# Patient Record
Sex: MALE | Race: WHITE | NOT HISPANIC OR LATINO | URBAN - METROPOLITAN AREA
[De-identification: names, ages, dates, MRNs, and addresses within clinical notes are randomized per-mention and may not be internally consistent; named-entity substitution may affect disease eponyms.]

---

## 2017-05-18 ENCOUNTER — APPOINTMENT (RX ONLY)
Dept: URBAN - METROPOLITAN AREA OTHER 9 | Facility: OTHER | Age: 52
Setting detail: DERMATOLOGY
End: 2017-05-18

## 2017-05-18 DIAGNOSIS — L91.8 OTHER HYPERTROPHIC DISORDERS OF THE SKIN: ICD-10-CM

## 2017-05-18 DIAGNOSIS — L82.0 INFLAMED SEBORRHEIC KERATOSIS: ICD-10-CM

## 2017-05-18 DIAGNOSIS — D22 MELANOCYTIC NEVI: ICD-10-CM

## 2017-05-18 DIAGNOSIS — D18.0 HEMANGIOMA: ICD-10-CM

## 2017-05-18 DIAGNOSIS — L72.8 OTHER FOLLICULAR CYSTS OF THE SKIN AND SUBCUTANEOUS TISSUE: ICD-10-CM

## 2017-05-18 PROBLEM — D22.5 MELANOCYTIC NEVI OF TRUNK: Status: ACTIVE | Noted: 2017-05-18

## 2017-05-18 PROBLEM — D18.01 HEMANGIOMA OF SKIN AND SUBCUTANEOUS TISSUE: Status: ACTIVE | Noted: 2017-05-18

## 2017-05-18 PROCEDURE — 17110 DESTRUCTION B9 LES UP TO 14: CPT

## 2017-05-18 PROCEDURE — ? COUNSELING

## 2017-05-18 PROCEDURE — ? LIQUID NITROGEN

## 2017-05-18 PROCEDURE — 99203 OFFICE O/P NEW LOW 30 MIN: CPT | Mod: 25

## 2017-05-18 ASSESSMENT — LOCATION ZONE DERM
LOCATION ZONE: TOE
LOCATION ZONE: FACE
LOCATION ZONE: TRUNK
LOCATION ZONE: NECK

## 2017-05-18 ASSESSMENT — LOCATION SIMPLE DESCRIPTION DERM
LOCATION SIMPLE: RIGHT 3RD TOE
LOCATION SIMPLE: CHEST
LOCATION SIMPLE: LEFT ANTERIOR NECK
LOCATION SIMPLE: RIGHT TEMPLE
LOCATION SIMPLE: ABDOMEN

## 2017-05-18 ASSESSMENT — LOCATION DETAILED DESCRIPTION DERM
LOCATION DETAILED: LEFT LATERAL SUPERIOR CHEST
LOCATION DETAILED: LEFT SUPERIOR LATERAL NECK
LOCATION DETAILED: RIGHT DORSAL 3RD TOE
LOCATION DETAILED: RIGHT INFERIOR TEMPLE
LOCATION DETAILED: LEFT RIB CAGE

## 2017-05-18 NOTE — PROCEDURE: LIQUID NITROGEN
Consent: Pt advised on the following including but not limited to risks of crusting, scabbing, blistering, scarring, darker or lighter pigmentary change, recurrence, incomplete removal and infection.
Post-Care Instructions: I reviewed with the patient in detail post-care instructions. Patient is to wear sunprotection, and avoid picking at any of the treated lesions. Pt may apply Vaseline to crusted or scabbing areas.
Include Z78.9 (Other Specified Conditions Influencing Health Status) As An Associated Diagnosis?: No
Detail Level: Simple
Medical Necessity Information: It is in your best interest to select a reason for this procedure from the list below. All of these items fulfill various CMS LCD requirements except the new and changing color options.
Medical Necessity Clause: This procedure was medically necessary because the lesions that were treated were:

## 2018-07-25 ENCOUNTER — APPOINTMENT (RX ONLY)
Dept: URBAN - METROPOLITAN AREA OTHER 4 | Facility: OTHER | Age: 53
Setting detail: DERMATOLOGY
End: 2018-07-25

## 2018-07-25 DIAGNOSIS — L72.3 SEBACEOUS CYST: ICD-10-CM

## 2018-07-25 DIAGNOSIS — L81.4 OTHER MELANIN HYPERPIGMENTATION: ICD-10-CM

## 2018-07-25 DIAGNOSIS — D18.0 HEMANGIOMA: ICD-10-CM

## 2018-07-25 DIAGNOSIS — L91.8 OTHER HYPERTROPHIC DISORDERS OF THE SKIN: ICD-10-CM

## 2018-07-25 DIAGNOSIS — D22 MELANOCYTIC NEVI: ICD-10-CM

## 2018-07-25 PROBLEM — D22.5 MELANOCYTIC NEVI OF TRUNK: Status: ACTIVE | Noted: 2018-07-25

## 2018-07-25 PROBLEM — D18.01 HEMANGIOMA OF SKIN AND SUBCUTANEOUS TISSUE: Status: ACTIVE | Noted: 2018-07-25

## 2018-07-25 PROCEDURE — 99214 OFFICE O/P EST MOD 30 MIN: CPT

## 2018-07-25 PROCEDURE — ? COUNSELING

## 2018-07-25 ASSESSMENT — LOCATION DETAILED DESCRIPTION DERM
LOCATION DETAILED: RIGHT AXILLARY VAULT
LOCATION DETAILED: SUPERIOR THORACIC SPINE
LOCATION DETAILED: LEFT AXILLARY VAULT
LOCATION DETAILED: LEFT SUPERIOR LATERAL NECK
LOCATION DETAILED: RIGHT SUPERIOR MEDIAL UPPER BACK
LOCATION DETAILED: LEFT POSTERIOR SHOULDER
LOCATION DETAILED: LEFT MEDIAL INFERIOR CHEST
LOCATION DETAILED: RIGHT POSTERIOR SHOULDER

## 2018-07-25 ASSESSMENT — LOCATION SIMPLE DESCRIPTION DERM
LOCATION SIMPLE: RIGHT SHOULDER
LOCATION SIMPLE: RIGHT AXILLARY VAULT
LOCATION SIMPLE: LEFT SHOULDER
LOCATION SIMPLE: CHEST
LOCATION SIMPLE: RIGHT UPPER BACK
LOCATION SIMPLE: NECK
LOCATION SIMPLE: LEFT AXILLARY VAULT
LOCATION SIMPLE: UPPER BACK

## 2018-07-25 ASSESSMENT — LOCATION ZONE DERM
LOCATION ZONE: NECK
LOCATION ZONE: TRUNK
LOCATION ZONE: ARM
LOCATION ZONE: AXILLAE

## 2019-07-25 ENCOUNTER — APPOINTMENT (RX ONLY)
Dept: URBAN - METROPOLITAN AREA OTHER 4 | Facility: OTHER | Age: 54
Setting detail: DERMATOLOGY
End: 2019-07-25

## 2019-07-25 DIAGNOSIS — D22 MELANOCYTIC NEVI: ICD-10-CM

## 2019-07-25 DIAGNOSIS — L82.1 OTHER SEBORRHEIC KERATOSIS: ICD-10-CM

## 2019-07-25 DIAGNOSIS — D18.0 HEMANGIOMA: ICD-10-CM

## 2019-07-25 DIAGNOSIS — L72.8 OTHER FOLLICULAR CYSTS OF THE SKIN AND SUBCUTANEOUS TISSUE: ICD-10-CM

## 2019-07-25 PROBLEM — D22.5 MELANOCYTIC NEVI OF TRUNK: Status: ACTIVE | Noted: 2019-07-25

## 2019-07-25 PROBLEM — D18.01 HEMANGIOMA OF SKIN AND SUBCUTANEOUS TISSUE: Status: ACTIVE | Noted: 2019-07-25

## 2019-07-25 PROCEDURE — ? COUNSELING

## 2019-07-25 PROCEDURE — 99214 OFFICE O/P EST MOD 30 MIN: CPT

## 2019-07-25 ASSESSMENT — LOCATION SIMPLE DESCRIPTION DERM
LOCATION SIMPLE: CHEST
LOCATION SIMPLE: RIGHT UPPER BACK
LOCATION SIMPLE: UPPER BACK
LOCATION SIMPLE: NECK
LOCATION SIMPLE: RIGHT FOOT

## 2019-07-25 ASSESSMENT — LOCATION DETAILED DESCRIPTION DERM
LOCATION DETAILED: RIGHT DORSAL FOOT
LOCATION DETAILED: RIGHT MID-UPPER BACK
LOCATION DETAILED: LEFT CENTRAL LATERAL NECK
LOCATION DETAILED: SUPERIOR THORACIC SPINE
LOCATION DETAILED: LEFT MEDIAL SUPERIOR CHEST

## 2019-07-25 ASSESSMENT — LOCATION ZONE DERM
LOCATION ZONE: TRUNK
LOCATION ZONE: FEET
LOCATION ZONE: NECK

## 2019-07-25 ASSESSMENT — PAIN INTENSITY VAS: HOW INTENSE IS YOUR PAIN 0 BEING NO PAIN, 10 BEING THE MOST SEVERE PAIN POSSIBLE?: NO PAIN

## 2020-07-29 ENCOUNTER — APPOINTMENT (RX ONLY)
Dept: URBAN - METROPOLITAN AREA OTHER 4 | Facility: OTHER | Age: 55
Setting detail: DERMATOLOGY
End: 2020-07-29

## 2020-07-29 DIAGNOSIS — D22 MELANOCYTIC NEVI: ICD-10-CM

## 2020-07-29 DIAGNOSIS — L81.4 OTHER MELANIN HYPERPIGMENTATION: ICD-10-CM

## 2020-07-29 DIAGNOSIS — L82.1 OTHER SEBORRHEIC KERATOSIS: ICD-10-CM

## 2020-07-29 DIAGNOSIS — L82.0 INFLAMED SEBORRHEIC KERATOSIS: ICD-10-CM

## 2020-07-29 DIAGNOSIS — D18.0 HEMANGIOMA: ICD-10-CM

## 2020-07-29 PROBLEM — D22.5 MELANOCYTIC NEVI OF TRUNK: Status: ACTIVE | Noted: 2020-07-29

## 2020-07-29 PROBLEM — D18.01 HEMANGIOMA OF SKIN AND SUBCUTANEOUS TISSUE: Status: ACTIVE | Noted: 2020-07-29

## 2020-07-29 PROCEDURE — 99214 OFFICE O/P EST MOD 30 MIN: CPT | Mod: 25

## 2020-07-29 PROCEDURE — ? LIQUID NITROGEN

## 2020-07-29 PROCEDURE — 17110 DESTRUCTION B9 LES UP TO 14: CPT

## 2020-07-29 PROCEDURE — ? COUNSELING

## 2020-07-29 ASSESSMENT — LOCATION ZONE DERM
LOCATION ZONE: ARM
LOCATION ZONE: TRUNK

## 2020-07-29 ASSESSMENT — LOCATION SIMPLE DESCRIPTION DERM
LOCATION SIMPLE: UPPER BACK
LOCATION SIMPLE: ABDOMEN
LOCATION SIMPLE: CHEST
LOCATION SIMPLE: LEFT SHOULDER
LOCATION SIMPLE: RIGHT UPPER BACK

## 2020-07-29 ASSESSMENT — PAIN INTENSITY VAS: HOW INTENSE IS YOUR PAIN 0 BEING NO PAIN, 10 BEING THE MOST SEVERE PAIN POSSIBLE?: NO PAIN

## 2020-07-29 ASSESSMENT — LOCATION DETAILED DESCRIPTION DERM
LOCATION DETAILED: LEFT POSTERIOR SHOULDER
LOCATION DETAILED: SUPERIOR THORACIC SPINE
LOCATION DETAILED: RIGHT MID-UPPER BACK
LOCATION DETAILED: LEFT RIB CAGE
LOCATION DETAILED: LEFT MEDIAL SUPERIOR CHEST

## 2020-07-29 NOTE — PROCEDURE: LIQUID NITROGEN
Consent: Pt advised on the following including but not limited to risks of crusting, scabbing, blistering, scarring, darker or lighter pigmentary change, recurrence, incomplete removal and infection.
Include Z78.9 (Other Specified Conditions Influencing Health Status) As An Associated Diagnosis?: No
Detail Level: Simple
Medical Necessity Information: It is in your best interest to select a reason for this procedure from the list below. All of these items fulfill various CMS LCD requirements except the new and changing color options.
Pared With?: 15 blade
Post-Care Instructions: I reviewed with the patient in detail post-care instructions. Patient is to wear sunprotection, and avoid picking at any of the treated lesions. Pt may apply Vaseline to crusted or scabbing areas.
Medical Necessity Clause: This procedure was medically necessary because the lesions that were treated were:
Duration Of Freeze Thaw-Cycle (Seconds): 5-10
Render Post-Care Instructions In Note?: yes

## 2020-11-02 ENCOUNTER — LAB OUTSIDE AN ENCOUNTER (OUTPATIENT)
Dept: URBAN - METROPOLITAN AREA TELEHEALTH 2 | Facility: TELEHEALTH | Age: 55
End: 2020-11-02

## 2020-11-02 ENCOUNTER — TELEPHONE ENCOUNTER (OUTPATIENT)
Dept: URBAN - METROPOLITAN AREA CLINIC 92 | Facility: CLINIC | Age: 55
End: 2020-11-02

## 2020-11-02 ENCOUNTER — OFFICE VISIT (OUTPATIENT)
Dept: URBAN - METROPOLITAN AREA TELEHEALTH 2 | Facility: TELEHEALTH | Age: 55
End: 2020-11-02
Payer: COMMERCIAL

## 2020-11-02 DIAGNOSIS — K63.5 COLON POLYP: ICD-10-CM

## 2020-11-02 DIAGNOSIS — Z09 FOLLOW UP: ICD-10-CM

## 2020-11-02 DIAGNOSIS — Z91.89 COLON CANCER HIGH RISK: ICD-10-CM

## 2020-11-02 DIAGNOSIS — K22.70 BARRETT ESOPHAGUS: ICD-10-CM

## 2020-11-02 PROCEDURE — G8427 DOCREV CUR MEDS BY ELIG CLIN: HCPCS | Performed by: INTERNAL MEDICINE

## 2020-11-02 PROCEDURE — G9622 NO UNHEAL ETOH USER: HCPCS | Performed by: INTERNAL MEDICINE

## 2020-11-02 PROCEDURE — 3017F COLORECTAL CA SCREEN DOC REV: CPT | Performed by: INTERNAL MEDICINE

## 2020-11-02 PROCEDURE — G9903 PT SCRN TBCO ID AS NON USER: HCPCS | Performed by: INTERNAL MEDICINE

## 2020-11-02 PROCEDURE — G8482 FLU IMMUNIZE ORDER/ADMIN: HCPCS | Performed by: INTERNAL MEDICINE

## 2020-11-02 PROCEDURE — 1036F TOBACCO NON-USER: CPT | Performed by: INTERNAL MEDICINE

## 2020-11-02 PROCEDURE — G8420 CALC BMI NORM PARAMETERS: HCPCS | Performed by: INTERNAL MEDICINE

## 2020-11-02 PROCEDURE — 99214 OFFICE O/P EST MOD 30 MIN: CPT | Performed by: INTERNAL MEDICINE

## 2020-11-02 RX ORDER — SODIUM, POTASSIUM,MAG SULFATES 17.5-3.13G
354ML SOLUTION, RECONSTITUTED, ORAL ORAL
Qty: 354 MILLILITER | Refills: 0 | OUTPATIENT
Start: 2020-11-02 | End: 2020-11-03

## 2020-11-02 RX ORDER — OMEPRAZOLE 40 MG/1
1 CAPSULE CAPSULE, DELAYED RELEASE ORAL ONCE A DAY
Qty: 90 CAPSULE | Refills: 4 | OUTPATIENT

## 2020-11-02 RX ORDER — TERAZOSIN 2 MG/1
TAKE 1 CAPSULE (2 MG) BY ORAL ROUTE ONCE DAILY AT BEDTIME CAPSULE ORAL 1
Qty: 0 | Refills: 0 | Status: ACTIVE | COMMUNITY
Start: 1900-01-01 | End: 1900-01-01

## 2020-12-31 ENCOUNTER — OFFICE VISIT (OUTPATIENT)
Dept: URBAN - METROPOLITAN AREA SURGERY CENTER 18 | Facility: SURGERY CENTER | Age: 55
End: 2020-12-31
Payer: COMMERCIAL

## 2020-12-31 ENCOUNTER — TELEPHONE ENCOUNTER (OUTPATIENT)
Dept: URBAN - METROPOLITAN AREA CLINIC 92 | Facility: CLINIC | Age: 55
End: 2020-12-31

## 2020-12-31 DIAGNOSIS — K29.60 ADENOPAPILLOMATOSIS GASTRICA: ICD-10-CM

## 2020-12-31 DIAGNOSIS — K63.5 BENIGN COLON POLYP: ICD-10-CM

## 2020-12-31 DIAGNOSIS — Z86.010 H/O ADENOMATOUS POLYP OF COLON: ICD-10-CM

## 2020-12-31 DIAGNOSIS — K22.8 COLUMNAR-LINED ESOPHAGUS: ICD-10-CM

## 2020-12-31 PROCEDURE — 43239 EGD BIOPSY SINGLE/MULTIPLE: CPT | Performed by: INTERNAL MEDICINE

## 2020-12-31 PROCEDURE — G8907 PT DOC NO EVENTS ON DISCHARG: HCPCS | Performed by: INTERNAL MEDICINE

## 2020-12-31 PROCEDURE — 45380 COLONOSCOPY AND BIOPSY: CPT | Performed by: INTERNAL MEDICINE

## 2020-12-31 PROCEDURE — G9936 PMH PLYP/NEO CO/RECT/JUN/ANS: HCPCS | Performed by: INTERNAL MEDICINE

## 2020-12-31 RX ORDER — TERAZOSIN 2 MG/1
TAKE 1 CAPSULE (2 MG) BY ORAL ROUTE ONCE DAILY AT BEDTIME CAPSULE ORAL 1
Qty: 0 | Refills: 0 | Status: ACTIVE | COMMUNITY
Start: 1900-01-01 | End: 1900-01-01

## 2020-12-31 RX ORDER — OMEPRAZOLE 40 MG/1
1 CAPSULE CAPSULE, DELAYED RELEASE ORAL ONCE A DAY
Qty: 90 CAPSULE | Refills: 4 | Status: ACTIVE | COMMUNITY

## 2021-01-01 ENCOUNTER — TELEPHONE ENCOUNTER (OUTPATIENT)
Dept: URBAN - METROPOLITAN AREA CLINIC 92 | Facility: CLINIC | Age: 56
End: 2021-01-01

## 2021-02-16 ENCOUNTER — OFFICE VISIT (OUTPATIENT)
Dept: URBAN - METROPOLITAN AREA CLINIC 96 | Facility: CLINIC | Age: 56
End: 2021-02-16
Payer: COMMERCIAL

## 2021-02-16 ENCOUNTER — WEB ENCOUNTER (OUTPATIENT)
Dept: URBAN - METROPOLITAN AREA CLINIC 96 | Facility: CLINIC | Age: 56
End: 2021-02-16

## 2021-02-16 ENCOUNTER — LAB OUTSIDE AN ENCOUNTER (OUTPATIENT)
Dept: URBAN - METROPOLITAN AREA CLINIC 96 | Facility: CLINIC | Age: 56
End: 2021-02-16

## 2021-02-16 DIAGNOSIS — K63.5 COLON POLYP: ICD-10-CM

## 2021-02-16 DIAGNOSIS — Z09 FOLLOW UP: ICD-10-CM

## 2021-02-16 DIAGNOSIS — K59.01 CONSTIPATION: ICD-10-CM

## 2021-02-16 DIAGNOSIS — K21.9 GERD (GASTROESOPHAGEAL REFLUX DISEASE): ICD-10-CM

## 2021-02-16 DIAGNOSIS — Z71.89 VACCINE COUNSELING: ICD-10-CM

## 2021-02-16 DIAGNOSIS — K22.70 BARRETT ESOPHAGUS: ICD-10-CM

## 2021-02-16 PROCEDURE — 1036F TOBACCO NON-USER: CPT | Performed by: INTERNAL MEDICINE

## 2021-02-16 PROCEDURE — 99214 OFFICE O/P EST MOD 30 MIN: CPT | Performed by: INTERNAL MEDICINE

## 2021-02-16 PROCEDURE — 3017F COLORECTAL CA SCREEN DOC REV: CPT | Performed by: INTERNAL MEDICINE

## 2021-02-16 PROCEDURE — G9622 NO UNHEAL ETOH USER: HCPCS | Performed by: INTERNAL MEDICINE

## 2021-02-16 PROCEDURE — G8420 CALC BMI NORM PARAMETERS: HCPCS | Performed by: INTERNAL MEDICINE

## 2021-02-16 PROCEDURE — G8482 FLU IMMUNIZE ORDER/ADMIN: HCPCS | Performed by: INTERNAL MEDICINE

## 2021-02-16 PROCEDURE — G8427 DOCREV CUR MEDS BY ELIG CLIN: HCPCS | Performed by: INTERNAL MEDICINE

## 2021-02-16 PROCEDURE — G9903 PT SCRN TBCO ID AS NON USER: HCPCS | Performed by: INTERNAL MEDICINE

## 2021-02-16 RX ORDER — OMEPRAZOLE 40 MG/1
1 CAPSULE CAPSULE, DELAYED RELEASE ORAL ONCE A DAY
Qty: 90 CAPSULE | Refills: 4 | Status: ACTIVE | COMMUNITY

## 2021-02-16 RX ORDER — PANTOPRAZOLE SODIUM 40 MG/1
1 TABLET TABLET, DELAYED RELEASE ORAL ONCE A DAY
Qty: 90 | Refills: 4 | OUTPATIENT
Start: 2021-02-16

## 2021-02-16 RX ORDER — TERAZOSIN 2 MG/1
TAKE 1 CAPSULE (2 MG) BY ORAL ROUTE ONCE DAILY AT BEDTIME CAPSULE ORAL 1
Qty: 0 | Refills: 0 | Status: ACTIVE | COMMUNITY
Start: 1900-01-01

## 2021-02-16 NOTE — HPI-TODAY'S VISIT:
Tiago Maher is a 56 y/o individual with h/o Barrera's esophagus who is here for follow-up of his condition. . He has had chronic reflux disease.  His disease is under control on omeprazole 40mg daily.  If he misses a dose, he has recurrence of sxs within 1-2 days.  No N/V/abdominal pain, no dysphagia.   Normal BM, no blood in the stool.  . Previously on 11/2/2020, pt reports that overall he is doing well.  His reflux is doing well overall.  Is taking his H2 blocker, nearly on a daily basis.  . Today on 2/16/21, pt reports that he has no sxs of GERD while taking omeprazole.  He had EGD which revealed active inflammation, but no Barretts or dysplasia/metaplasia.  He will occasionally skip a dose of PPI, but flares. .  SH:  . PMH: GERD and Barrera's h/o.  BPH FH: No GI malignancy . 12/2020: A Gastric, biopsy Chronic gastritis. No Helicobacter pylori microorganisms are identified with a special stain. B Esophagogastric junction, biopsy Gastric mucosa with chronic inflammation. No intestinal metaplasia identified. An alcian blue/PAS stain is negative for both fungal elements and intestinal metaplasia. C Transverse colon, polypectomy Hyperplastic polyp. .  Colonoscopy with h/o polyps (2007 and 2010), no polyp in 2015, due in 2020 . 6/2018 EGD: There were esophageal mucosal changes secondary to established short-segment Barrera's disease present in the lower third of the esophagus.  Mucosa was biopsied with a cold forceps for histology in 4 quadrants in the lower third of the esophagus. . FINAL MICROSCOPIC DIAGNOSIS:  A. DISTAL ESOPHAGUS, BIOPSY: -Squamous mucosa with reflux associated changes. -Gastric cardia type mucosa with chronic inflammation and foveolar hyperplasia. -Alcian blue/PAS stain is negative for intestinal metaplasia. No fungus. -There is no evidence of dysplasia or malignancy. . 12/2016: chronic esophagitis, no dysplasia .

## 2021-08-04 ENCOUNTER — APPOINTMENT (RX ONLY)
Dept: URBAN - METROPOLITAN AREA OTHER 4 | Facility: OTHER | Age: 56
Setting detail: DERMATOLOGY
End: 2021-08-04

## 2021-08-04 DIAGNOSIS — L82.1 OTHER SEBORRHEIC KERATOSIS: ICD-10-CM

## 2021-08-04 DIAGNOSIS — L57.0 ACTINIC KERATOSIS: ICD-10-CM

## 2021-08-04 DIAGNOSIS — B35.1 TINEA UNGUIUM: ICD-10-CM

## 2021-08-04 DIAGNOSIS — D22 MELANOCYTIC NEVI: ICD-10-CM

## 2021-08-04 DIAGNOSIS — L57.8 OTHER SKIN CHANGES DUE TO CHRONIC EXPOSURE TO NONIONIZING RADIATION: ICD-10-CM

## 2021-08-04 PROBLEM — D22.5 MELANOCYTIC NEVI OF TRUNK: Status: ACTIVE | Noted: 2021-08-04

## 2021-08-04 PROCEDURE — ? LIQUID NITROGEN

## 2021-08-04 PROCEDURE — 99213 OFFICE O/P EST LOW 20 MIN: CPT | Mod: 25

## 2021-08-04 PROCEDURE — 17000 DESTRUCT PREMALG LESION: CPT

## 2021-08-04 PROCEDURE — ? COUNSELING

## 2021-08-04 ASSESSMENT — LOCATION SIMPLE DESCRIPTION DERM
LOCATION SIMPLE: LEFT UPPER BACK
LOCATION SIMPLE: RIGHT FOREHEAD
LOCATION SIMPLE: LEFT ANTERIOR NECK
LOCATION SIMPLE: RIGHT GREAT TOE
LOCATION SIMPLE: LEFT GREAT TOE

## 2021-08-04 ASSESSMENT — LOCATION DETAILED DESCRIPTION DERM
LOCATION DETAILED: LEFT MID-UPPER BACK
LOCATION DETAILED: LEFT MEDIAL UPPER BACK
LOCATION DETAILED: RIGHT INFERIOR LATERAL FOREHEAD
LOCATION DETAILED: RIGHT GREAT TOENAIL
LOCATION DETAILED: LEFT CLAVICULAR NECK
LOCATION DETAILED: LEFT DISTAL PLANTAR GREAT TOE

## 2021-08-04 ASSESSMENT — LOCATION ZONE DERM
LOCATION ZONE: TOENAIL
LOCATION ZONE: TRUNK
LOCATION ZONE: NECK
LOCATION ZONE: FACE
LOCATION ZONE: TOE

## 2021-08-04 NOTE — PROCEDURE: LIQUID NITROGEN
Render Post-Care Instructions In Note?: no
Detail Level: Simple
Consent: The patient's consent was obtained including but not limited to risks of crusting, scabbing, blistering, scarring, darker or lighter pigmentary change, recurrence, incomplete removal and infection.
Duration Of Freeze Thaw-Cycle (Seconds): 5
Post-Care Instructions: I reviewed with the patient in detail post-care instructions. Patient is to wear sunprotection, and avoid picking at any of the treated lesions. Pt may apply Vaseline to crusted or scabbing areas.
Number Of Freeze-Thaw Cycles: 1 freeze-thaw cycle
Show Applicator Variable?: Yes

## 2022-02-01 ENCOUNTER — OFFICE VISIT (OUTPATIENT)
Dept: URBAN - METROPOLITAN AREA CLINIC 96 | Facility: CLINIC | Age: 57
End: 2022-02-01

## 2022-02-01 RX ORDER — TERAZOSIN 2 MG/1
TAKE 1 CAPSULE (2 MG) BY ORAL ROUTE ONCE DAILY AT BEDTIME CAPSULE ORAL 1
Qty: 0 | Refills: 0 | COMMUNITY
Start: 1900-01-01

## 2022-02-01 RX ORDER — PANTOPRAZOLE SODIUM 40 MG/1
1 TABLET TABLET, DELAYED RELEASE ORAL ONCE A DAY
Qty: 90 | Refills: 4 | COMMUNITY
Start: 2021-02-16

## 2022-02-01 RX ORDER — OMEPRAZOLE 40 MG/1
1 CAPSULE CAPSULE, DELAYED RELEASE ORAL ONCE A DAY
Qty: 90 CAPSULE | Refills: 4 | COMMUNITY

## 2022-02-22 ENCOUNTER — OFFICE VISIT (OUTPATIENT)
Dept: URBAN - METROPOLITAN AREA CLINIC 96 | Facility: CLINIC | Age: 57
End: 2022-02-22
Payer: COMMERCIAL

## 2022-02-22 ENCOUNTER — LAB OUTSIDE AN ENCOUNTER (OUTPATIENT)
Dept: URBAN - METROPOLITAN AREA CLINIC 96 | Facility: CLINIC | Age: 57
End: 2022-02-22

## 2022-02-22 DIAGNOSIS — K22.70 BARRETT ESOPHAGUS: ICD-10-CM

## 2022-02-22 DIAGNOSIS — K63.5 COLON POLYP: ICD-10-CM

## 2022-02-22 DIAGNOSIS — K21.9 GERD (GASTROESOPHAGEAL REFLUX DISEASE): ICD-10-CM

## 2022-02-22 DIAGNOSIS — K59.01 CONSTIPATION: ICD-10-CM

## 2022-02-22 DIAGNOSIS — Z09 FOLLOW UP: ICD-10-CM

## 2022-02-22 PROCEDURE — 99213 OFFICE O/P EST LOW 20 MIN: CPT | Performed by: INTERNAL MEDICINE

## 2022-02-22 RX ORDER — PANTOPRAZOLE SODIUM 40 MG/1
1 TABLET TABLET, DELAYED RELEASE ORAL ONCE A DAY
Qty: 90 | Refills: 4 | OUTPATIENT

## 2022-02-22 RX ORDER — PANTOPRAZOLE SODIUM 40 MG/1
1 TABLET TABLET, DELAYED RELEASE ORAL ONCE A DAY
Qty: 90 | Refills: 4 | COMMUNITY
Start: 2021-02-16

## 2022-02-22 RX ORDER — OMEPRAZOLE 40 MG/1
1 CAPSULE CAPSULE, DELAYED RELEASE ORAL ONCE A DAY
Qty: 90 CAPSULE | Refills: 4 | COMMUNITY

## 2022-02-22 RX ORDER — TERAZOSIN 2 MG/1
TAKE 1 CAPSULE (2 MG) BY ORAL ROUTE ONCE DAILY AT BEDTIME CAPSULE ORAL 1
Qty: 0 | Refills: 0 | COMMUNITY
Start: 1900-01-01

## 2022-02-22 NOTE — HPI-TODAY'S VISIT:
Pt Gunnar is a 57 y/o individual with h/o Barrera's esophagus who is here for follow-up of his condition. . He has had chronic reflux disease.  His disease is under control on omeprazole 40mg daily.  If he misses a dose, he has recurrence of sxs within 1-2 days.  No N/V/abdominal pain, no dysphagia.   Normal BM, no blood in the stool.  . Previously on 11/2/2020, pt reports that overall he is doing well.  His reflux is doing well overall.  Is taking his H2 blocker, nearly on a daily basis. . Previously on 2/16/21, pt reports that he has no sxs of GERD while taking omeprazole.  He had EGD which revealed active inflammation, but no Barretts or dysplasia/metaplasia.  He will occasionally skip a dose of PPI, but flares. . Today on 2/22/2022, pt reports that he is doing on once daily protonix  Does not take Pepcid.   . SH:  . PMH: GERD and Barrera's h/o.  BPH FH: No GI malignancy . 12/2020: A Gastric, biopsy Chronic gastritis. No Helicobacter pylori microorganisms are identified with a special stain. B Esophagogastric junction, biopsy Gastric mucosa with chronic inflammation. No intestinal metaplasia identified. An alcian blue/PAS stain is negative for both fungal elements and intestinal metaplasia. C Transverse colon, polypectomy Hyperplastic polyp. . Colonoscopy with h/o polyps (2007 and 2010), no polyp in 2015, due in 2020 . 6/2018 EGD: There were esophageal mucosal changes secondary to established short-segment Barrera's disease present in the lower third of the esophagus.  Mucosa was biopsied with a cold forceps for histology in 4 quadrants in the lower third of the esophagus. . FINAL MICROSCOPIC DIAGNOSIS:  A. DISTAL ESOPHAGUS, BIOPSY: -Squamous mucosa with reflux associated changes. -Gastric cardia type mucosa with chronic inflammation and foveolar hyperplasia. -Alcian blue/PAS stain is negative for intestinal metaplasia. No fungus. -There is no evidence of dysplasia or malignancy. . 12/2016: chronic esophagitis, no dysplasia

## 2022-04-14 ENCOUNTER — OFFICE VISIT (OUTPATIENT)
Dept: URBAN - METROPOLITAN AREA SURGERY CENTER 18 | Facility: SURGERY CENTER | Age: 57
End: 2022-04-14
Payer: COMMERCIAL

## 2022-04-14 DIAGNOSIS — K22.89 OTHER SPECIFIED DISEASE OF ESOPHAGUS: ICD-10-CM

## 2022-04-14 DIAGNOSIS — K31.89 GASTRIC CYST: ICD-10-CM

## 2022-04-14 PROCEDURE — G8907 PT DOC NO EVENTS ON DISCHARG: HCPCS | Performed by: INTERNAL MEDICINE

## 2022-04-14 PROCEDURE — 43239 EGD BIOPSY SINGLE/MULTIPLE: CPT | Performed by: INTERNAL MEDICINE

## 2022-04-14 RX ORDER — OMEPRAZOLE 40 MG/1
1 CAPSULE CAPSULE, DELAYED RELEASE ORAL ONCE A DAY
Qty: 90 CAPSULE | Refills: 4 | COMMUNITY

## 2022-04-14 RX ORDER — TERAZOSIN 2 MG/1
TAKE 1 CAPSULE (2 MG) BY ORAL ROUTE ONCE DAILY AT BEDTIME CAPSULE ORAL 1
Qty: 0 | Refills: 0 | COMMUNITY
Start: 1900-01-01

## 2022-04-14 RX ORDER — PANTOPRAZOLE SODIUM 40 MG/1
1 TABLET TABLET, DELAYED RELEASE ORAL ONCE A DAY
Qty: 90 | Refills: 4 | Status: ACTIVE | COMMUNITY

## 2022-07-20 ENCOUNTER — OFFICE VISIT (OUTPATIENT)
Dept: URBAN - METROPOLITAN AREA CLINIC 96 | Facility: CLINIC | Age: 57
End: 2022-07-20
Payer: COMMERCIAL

## 2022-07-20 VITALS — WEIGHT: 188 LBS | HEIGHT: 72 IN | BODY MASS INDEX: 25.47 KG/M2

## 2022-07-20 DIAGNOSIS — K59.01 CONSTIPATION: ICD-10-CM

## 2022-07-20 DIAGNOSIS — K21.9 GERD (GASTROESOPHAGEAL REFLUX DISEASE): ICD-10-CM

## 2022-07-20 DIAGNOSIS — Z09 FOLLOW UP: ICD-10-CM

## 2022-07-20 DIAGNOSIS — K63.5 COLON POLYP: ICD-10-CM

## 2022-07-20 DIAGNOSIS — K20.0 EOSINOPHILIC ESOPHAGITIS: ICD-10-CM

## 2022-07-20 DIAGNOSIS — K22.70 BARRETT ESOPHAGUS: ICD-10-CM

## 2022-07-20 PROBLEM — 235599003: Status: ACTIVE | Noted: 2022-07-20

## 2022-07-20 PROBLEM — 235595009 GASTROESOPHAGEAL REFLUX DISEASE: Status: ACTIVE | Noted: 2021-02-16

## 2022-07-20 PROBLEM — 14760008 CONSTIPATION: Status: ACTIVE | Noted: 2021-02-16

## 2022-07-20 PROCEDURE — 99214 OFFICE O/P EST MOD 30 MIN: CPT | Performed by: INTERNAL MEDICINE

## 2022-07-20 RX ORDER — PANTOPRAZOLE SODIUM 40 MG/1
1 TABLET TABLET, DELAYED RELEASE ORAL ONCE A DAY
Qty: 90 | Refills: 4 | Status: ACTIVE | COMMUNITY

## 2022-07-20 RX ORDER — TERAZOSIN 2 MG/1
TAKE 1 CAPSULE (2 MG) BY ORAL ROUTE ONCE DAILY AT BEDTIME CAPSULE ORAL 1
Qty: 0 | Refills: 0 | COMMUNITY
Start: 1900-01-01

## 2022-07-20 RX ORDER — PANTOPRAZOLE SODIUM 40 MG/1
1 TABLET TABLET, DELAYED RELEASE ORAL ONCE A DAY
Qty: 90 | Refills: 4 | OUTPATIENT

## 2022-07-20 RX ORDER — PANTOPRAZOLE SODIUM 20 MG/1
1 TABLET TABLET, DELAYED RELEASE ORAL ONCE A DAY
Qty: 30 TABLET | Refills: 11 | OUTPATIENT
Start: 2022-07-20

## 2022-07-20 RX ORDER — OMEPRAZOLE 40 MG/1
1 CAPSULE CAPSULE, DELAYED RELEASE ORAL ONCE A DAY
Qty: 90 CAPSULE | Refills: 4 | COMMUNITY

## 2022-07-20 NOTE — HPI-TODAY'S VISIT:
Pt Gunnar is a 58 y/o individual with h/o Barrera's esophagus who is here for follow-up of his condition. . He has had chronic reflux disease.  His disease is under control on omeprazole 40mg daily.  If he misses a dose, he has recurrence of sxs within 1-2 days.  No N/V/abdominal pain, no dysphagia.   Normal BM, no blood in the stool.  . Previously on 11/2/2020, pt reports that overall he is doing well.  His reflux is doing well overall.  Is taking his H2 blocker, nearly on a daily basis. . Previously on 2/16/21, pt reports that he has no sxs of GERD while taking omeprazole.  He had EGD which revealed active inflammation, but no Barretts or dysplasia/metaplasia.  He will occasionally skip a dose of PPI, but flares. . Previously on 2/22/2022, pt reports that he is doing on once daily protonix  Does not take Pepcid.   . Today on 7/20/2022, pt reports that he has no dysphagia/n/v/gerd.  If he misses a day, has breakthrough. . SH:  . PMH: GERD and Barrera's h/o.  BPH FH: No GI malignancy . 4/2022: The examined duodenum was normal Patchy mildly erythematous mucosa without bleeding was found in the entire examined stomach. Biopsies were taken with a cold forceps for histology. The pathology specimen was placed into Bottle Number 1 The Z-line was irregular and was found 35 cm from the incisors. Biopsies were taken with a cold forceps for histology. The pathology specimen was placed into Bottle Number 2. A small hiatal hernia was present The upper third of the esophagus and middle third of the esophagus were normal. Overall, it appears to be in good control. . A Gastric, biopsy Gastric mucosa with slight, nonspecific chronic inflammation. No Helicobacter pylori microorganisms identified with a special stain. B Esophagogastric junction, biopsy Increased intraepithelial eosinophils (maximum count of 22 in a single microscopic high power field). AN ALCIAN BLUE/PAS STAIN IS NEGATIVE FOR BOTH INTESTINAL METAPLASIA AND FUNGAL ELEMENTS. See comment. COMMENT: No single absolute eosinophilic count is fully diagnostic of eosinophilic esophagitis. In the proper clinical setting (see below), these findings raise the possibility of a diagnosis of "eosinophilic esophagitis". Eosinophilic esophagitis is defined as a clinicopathologic entity, combining clinical data on: (1) relevant symptoms (e.g. food impaction and dysphagia in adults); (2) >/= 15 eosinophils per high power field in esophageal biopsies; and (3) exclusion of other causes of eosinophilia, especially GERD. Other causes of eosinophilia include the following: GERD, eosinophilic gastroenteritis, hypereosinophilic syndrome, parasitic infection, fungal infection, drug-induced injury, inflammatory bowel disease, various vasculitides, collagen vascular disease, and recurrent vomiting. Correlation with clinical and endoscopic findings is necessary . 12/2020: A Gastric, biopsy Chronic gastritis. No Helicobacter pylori microorganisms are identified with a special stain. B Esophagogastric junction, biopsy Gastric mucosa with chronic inflammation. No intestinal metaplasia identified. An alcian blue/PAS stain is negative for both fungal elements and intestinal metaplasia. C Transverse colon, polypectomy Hyperplastic polyp. . Colonoscopy with h/o polyps (2007 and 2010), no polyp in 2015, due in 2020 . 6/2018 EGD: There were esophageal mucosal changes secondary to established short-segment Barrera's disease present in the lower third of the esophagus.  Mucosa was biopsied with a cold forceps for histology in 4 quadrants in the lower third of the esophagus. . FINAL MICROSCOPIC DIAGNOSIS:  A. DISTAL ESOPHAGUS, BIOPSY: -Squamous mucosa with reflux associated changes. -Gastric cardia type mucosa with chronic inflammation and foveolar hyperplasia. -Alcian blue/PAS stain is negative for intestinal metaplasia. No fungus. -There is no evidence of dysplasia or malignancy. . 12/2016: chronic esophagitis, no dysplasia .

## 2022-08-08 ENCOUNTER — APPOINTMENT (RX ONLY)
Dept: URBAN - METROPOLITAN AREA OTHER 4 | Facility: OTHER | Age: 57
Setting detail: DERMATOLOGY
End: 2022-08-08

## 2022-08-08 DIAGNOSIS — L72.8 OTHER FOLLICULAR CYSTS OF THE SKIN AND SUBCUTANEOUS TISSUE: ICD-10-CM

## 2022-08-08 DIAGNOSIS — D22 MELANOCYTIC NEVI: ICD-10-CM

## 2022-08-08 DIAGNOSIS — L57.8 OTHER SKIN CHANGES DUE TO CHRONIC EXPOSURE TO NONIONIZING RADIATION: ICD-10-CM

## 2022-08-08 DIAGNOSIS — L82.1 OTHER SEBORRHEIC KERATOSIS: ICD-10-CM

## 2022-08-08 DIAGNOSIS — D18.0 HEMANGIOMA: ICD-10-CM

## 2022-08-08 PROBLEM — D22.5 MELANOCYTIC NEVI OF TRUNK: Status: ACTIVE | Noted: 2022-08-08

## 2022-08-08 PROBLEM — D18.01 HEMANGIOMA OF SKIN AND SUBCUTANEOUS TISSUE: Status: ACTIVE | Noted: 2022-08-08

## 2022-08-08 PROCEDURE — ? COUNSELING

## 2022-08-08 PROCEDURE — 99213 OFFICE O/P EST LOW 20 MIN: CPT

## 2022-08-08 ASSESSMENT — LOCATION DETAILED DESCRIPTION DERM
LOCATION DETAILED: LEFT SUPERIOR LATERAL NECK
LOCATION DETAILED: LEFT INFERIOR UPPER BACK
LOCATION DETAILED: LEFT MID-UPPER BACK
LOCATION DETAILED: LEFT CENTRAL MALAR CHEEK
LOCATION DETAILED: LEFT INFERIOR LATERAL MIDBACK

## 2022-08-08 ASSESSMENT — LOCATION ZONE DERM
LOCATION ZONE: TRUNK
LOCATION ZONE: FACE
LOCATION ZONE: NECK

## 2022-08-08 ASSESSMENT — LOCATION SIMPLE DESCRIPTION DERM
LOCATION SIMPLE: LEFT CHEEK
LOCATION SIMPLE: LEFT LOWER BACK
LOCATION SIMPLE: NECK
LOCATION SIMPLE: LEFT UPPER BACK

## 2023-07-19 ENCOUNTER — WEB ENCOUNTER (OUTPATIENT)
Age: 58
End: 2023-07-19

## 2023-07-19 ENCOUNTER — TELEPHONE ENCOUNTER (OUTPATIENT)
Dept: URBAN - METROPOLITAN AREA CLINIC 96 | Facility: CLINIC | Age: 58
End: 2023-07-19

## 2023-07-19 RX ORDER — PANTOPRAZOLE SODIUM 40 MG/1
1 TABLET TABLET, DELAYED RELEASE ORAL ONCE A DAY
Qty: 30 | Refills: 0

## 2023-07-19 RX ORDER — PANTOPRAZOLE SODIUM 40 MG/1
1 TABLET TABLET, DELAYED RELEASE ORAL ONCE A DAY
Qty: 30 | Refills: 3

## 2023-08-09 ENCOUNTER — APPOINTMENT (RX ONLY)
Dept: URBAN - METROPOLITAN AREA OTHER 5 | Facility: OTHER | Age: 58
Setting detail: DERMATOLOGY
End: 2023-08-09

## 2023-08-09 DIAGNOSIS — L82.1 OTHER SEBORRHEIC KERATOSIS: ICD-10-CM

## 2023-08-09 DIAGNOSIS — D18.0 HEMANGIOMA: ICD-10-CM

## 2023-08-09 DIAGNOSIS — L72.0 EPIDERMAL CYST: ICD-10-CM

## 2023-08-09 DIAGNOSIS — L57.8 OTHER SKIN CHANGES DUE TO CHRONIC EXPOSURE TO NONIONIZING RADIATION: ICD-10-CM

## 2023-08-09 DIAGNOSIS — D22 MELANOCYTIC NEVI: ICD-10-CM

## 2023-08-09 PROBLEM — D18.01 HEMANGIOMA OF SKIN AND SUBCUTANEOUS TISSUE: Status: ACTIVE | Noted: 2023-08-09

## 2023-08-09 PROBLEM — D22.5 MELANOCYTIC NEVI OF TRUNK: Status: ACTIVE | Noted: 2023-08-09

## 2023-08-09 PROCEDURE — 99213 OFFICE O/P EST LOW 20 MIN: CPT | Mod: 25

## 2023-08-09 PROCEDURE — 10060 I&D ABSCESS SIMPLE/SINGLE: CPT

## 2023-08-09 PROCEDURE — ? COUNSELING

## 2023-08-09 PROCEDURE — ? INCISION AND DRAINAGE

## 2023-08-09 ASSESSMENT — LOCATION SIMPLE DESCRIPTION DERM
LOCATION SIMPLE: CHEST
LOCATION SIMPLE: LEFT ANTERIOR NECK
LOCATION SIMPLE: RIGHT FOREHEAD
LOCATION SIMPLE: LEFT UPPER BACK

## 2023-08-09 ASSESSMENT — LOCATION DETAILED DESCRIPTION DERM
LOCATION DETAILED: RIGHT MEDIAL INFERIOR CHEST
LOCATION DETAILED: RIGHT INFERIOR LATERAL FOREHEAD
LOCATION DETAILED: LEFT LATERAL UPPER BACK
LOCATION DETAILED: LEFT INFERIOR LATERAL NECK

## 2023-08-09 ASSESSMENT — LOCATION ZONE DERM
LOCATION ZONE: FACE
LOCATION ZONE: NECK
LOCATION ZONE: TRUNK

## 2023-08-09 ASSESSMENT — PAIN INTENSITY VAS: HOW INTENSE IS YOUR PAIN 0 BEING NO PAIN, 10 BEING THE MOST SEVERE PAIN POSSIBLE?: NO PAIN

## 2023-08-09 NOTE — PROCEDURE: INCISION AND DRAINAGE
Detail Level: Detailed
Lesion Type: Cyst
Method: 5 mm punch
Curette: No
Anesthesia Type: 1% lidocaine with epinephrine
Anesthesia Volume In Cc: 0.4
Size Of Lesion In Cm (Optional But May Be Required For Some Insurances): 2
Drainage Amount?: moderate
Drainage Type?: cyst-like
Wound Care: Petrolatum
Dressing: dry sterile dressing
Epidermal Sutures: 4-0 Ethilon
Epidermal Closure: simple interrupted
Suture Text: The incision was partially closed with
Preparation Text: The area was prepped in the usual clean fashion.
Curette Text (Optional): After the contents were expressed a curette was used to partially remove the cyst wall.
Consent was obtained and risks were reviewed including but not limited to delayed wound healing, infection, need for multiple I and D's, and pain.
Post-Care Instructions: I reviewed with the patient in detail post-care instructions. Patient should keep wound covered and call the office should any redness, pain, swelling or worsening occur.

## 2023-09-06 ENCOUNTER — OFFICE VISIT (OUTPATIENT)
Dept: URBAN - METROPOLITAN AREA CLINIC 96 | Facility: CLINIC | Age: 58
End: 2023-09-06

## 2023-10-11 ENCOUNTER — TELEPHONE ENCOUNTER (OUTPATIENT)
Dept: URBAN - METROPOLITAN AREA CLINIC 96 | Facility: CLINIC | Age: 58
End: 2023-10-11

## 2023-10-11 ENCOUNTER — DASHBOARD ENCOUNTERS (OUTPATIENT)
Age: 58
End: 2023-10-11

## 2023-10-11 ENCOUNTER — OFFICE VISIT (OUTPATIENT)
Dept: URBAN - METROPOLITAN AREA CLINIC 96 | Facility: CLINIC | Age: 58
End: 2023-10-11
Payer: COMMERCIAL

## 2023-10-11 ENCOUNTER — LAB OUTSIDE AN ENCOUNTER (OUTPATIENT)
Dept: URBAN - METROPOLITAN AREA CLINIC 96 | Facility: CLINIC | Age: 58
End: 2023-10-11

## 2023-10-11 VITALS
BODY MASS INDEX: 25.06 KG/M2 | WEIGHT: 185 LBS | HEIGHT: 72 IN | DIASTOLIC BLOOD PRESSURE: 88 MMHG | SYSTOLIC BLOOD PRESSURE: 134 MMHG | TEMPERATURE: 98.8 F | HEART RATE: 85 BPM

## 2023-10-11 DIAGNOSIS — K21.9 GERD (GASTROESOPHAGEAL REFLUX DISEASE): ICD-10-CM

## 2023-10-11 DIAGNOSIS — K22.70 BARRETT ESOPHAGUS: ICD-10-CM

## 2023-10-11 DIAGNOSIS — K63.5 COLON POLYP: ICD-10-CM

## 2023-10-11 DIAGNOSIS — Z09 FOLLOW UP: ICD-10-CM

## 2023-10-11 DIAGNOSIS — Z86.010 ADENOMAS PERSONAL HISTORY OF COLONIC POLYPS: ICD-10-CM

## 2023-10-11 DIAGNOSIS — K20.0 EOSINOPHILIC ESOPHAGITIS: ICD-10-CM

## 2023-10-11 DIAGNOSIS — K59.01 CONSTIPATION: ICD-10-CM

## 2023-10-11 PROCEDURE — 99213 OFFICE O/P EST LOW 20 MIN: CPT | Performed by: INTERNAL MEDICINE

## 2023-10-11 RX ORDER — TERAZOSIN 2 MG/1
TAKE 1 CAPSULE (2 MG) BY ORAL ROUTE ONCE DAILY AT BEDTIME CAPSULE ORAL 1
Qty: 0 | Refills: 0 | COMMUNITY
Start: 1900-01-01

## 2023-10-11 RX ORDER — TERAZOSIN 2 MG/1
TAKE 1 CAPSULE BY MOUTH EVERY NIGHT AT BEDTIME CAPSULE ORAL
Qty: 30 EACH | Refills: 0 | Status: ACTIVE | COMMUNITY

## 2023-10-11 RX ORDER — OMEPRAZOLE 40 MG/1
1 CAPSULE CAPSULE, DELAYED RELEASE ORAL ONCE A DAY
Qty: 90 CAPSULE | Refills: 4 | COMMUNITY

## 2023-10-11 RX ORDER — PANTOPRAZOLE SODIUM 40 MG/1
1 TABLET TABLET, DELAYED RELEASE ORAL ONCE A DAY
Qty: 30 | Refills: 3 | Status: ACTIVE | COMMUNITY

## 2023-10-11 RX ORDER — ERGOCALCIFEROL 1.25 MG/1
TAKE 1 CAPSULE BY MOUTH ONE TIME PER WEEK CAPSULE, LIQUID FILLED ORAL
Qty: 4 EACH | Refills: 5 | Status: ACTIVE | COMMUNITY

## 2023-10-11 RX ORDER — PANTOPRAZOLE SODIUM 20 MG/1
1 TABLET TABLET, DELAYED RELEASE ORAL ONCE A DAY
Qty: 30 TABLET | Refills: 11 | Status: ACTIVE | COMMUNITY
Start: 2022-07-20

## 2023-10-11 RX ORDER — ONDANSETRON HYDROCHLORIDE 4 MG/1
1 TABLET TABLET, FILM COATED ORAL EVERY 4 HOURS PRN
Qty: 3 | Refills: 0 | OUTPATIENT
Start: 2023-10-11

## 2023-10-11 RX ORDER — PANTOPRAZOLE SODIUM 40 MG/1
1 TABLET TABLET, DELAYED RELEASE ORAL ONCE A DAY
Qty: 90 | Refills: 4 | OUTPATIENT

## 2023-10-11 RX ORDER — PANTOPRAZOLE SODIUM 40 MG/1
TABLET, DELAYED RELEASE ORAL
Qty: 30 TABLET | Status: ACTIVE | COMMUNITY

## 2023-10-11 NOTE — HPI-TODAY'S VISIT:
Pt Gunnar is a 59 y/o individual with h/o Barrera's esophagus who is here for follow-up of his condition. . He has had chronic reflux disease.  His disease is under control on omeprazole 40mg daily.  If he misses a dose, he has recurrence of sxs within 1-2 days.  No N/V/abdominal pain, no dysphagia.   Normal BM, no blood in the stool.  . Previously on 11/2/2020, pt reports that overall he is doing well.  His reflux is doing well overall.  Is taking his H2 blocker, nearly on a daily basis. . Previously on 2/16/21, pt reports that he has no sxs of GERD while taking omeprazole.  He had EGD which revealed active inflammation, but no Barretts or dysplasia/metaplasia.  He will occasionally skip a dose of PPI, but flares. . Previously on 2/22/2022, pt reports that he is doing on once daily protonix  Does not take Pepcid.   . Previously on 7/20/2022, pt reports that he has no dysphagia/n/v/gerd.  If he misses a day, has breakthrough. . Today on 10/11/2023, pt reports that he must take his PPI and if he misses his dose, he has sxs.  Variable water intake.   . SH:  . PMH: GERD and Barrera's h/o.  BPH FH: No GI malignancy . 4/2022: The examined duodenum was normal Patchy mildly erythematous mucosa without bleeding was found in the entire examined stomach. Biopsies were taken with a cold forceps for histology. The pathology specimen was placed into Bottle Number 1 The Z-line was irregular and was found 35 cm from the incisors. Biopsies were taken with a cold forceps for histology. The pathology specimen was placed into Bottle Number 2. A small hiatal hernia was present The upper third of the esophagus and middle third of the esophagus were normal. Overall, it appears to be in good control. . A Gastric, biopsy Gastric mucosa with slight, nonspecific chronic inflammation. No Helicobacter pylori microorganisms identified with a special stain. B Esophagogastric junction, biopsy Increased intraepithelial eosinophils (maximum count of 22 in a single microscopic high power field). AN ALCIAN BLUE/PAS STAIN IS NEGATIVE FOR BOTH INTESTINAL METAPLASIA AND FUNGAL ELEMENTS. See comment. COMMENT: No single absolute eosinophilic count is fully diagnostic of eosinophilic esophagitis. In the proper clinical setting (see below), these findings raise the possibility of a diagnosis of "eosinophilic esophagitis". Eosinophilic esophagitis is defined as a clinicopathologic entity, combining clinical data on: (1) relevant symptoms (e.g. food impaction and dysphagia in adults); (2) >/= 15 eosinophils per high power field in esophageal biopsies; and (3) exclusion of other causes of eosinophilia, especially GERD. Other causes of eosinophilia include the following: GERD, eosinophilic gastroenteritis, hypereosinophilic syndrome, parasitic infection, fungal infection, drug-induced injury, inflammatory bowel disease, various vasculitides, collagen vascular disease, and recurrent vomiting. Correlation with clinical and endoscopic findings is necessary . 12/2020: A Gastric, biopsy Chronic gastritis. No Helicobacter pylori microorganisms are identified with a special stain. B Esophagogastric junction, biopsy Gastric mucosa with chronic inflammation. No intestinal metaplasia identified. An alcian blue/PAS stain is negative for both fungal elements and intestinal metaplasia. C Transverse colon, polypectomy Hyperplastic polyp. . Colonoscopy with h/o polyps (2007 and 2010), no polyp in 2015 . Colonoscopy in 2020: fair prep, no adenoma . 6/2018 EGD: There were esophageal mucosal changes secondary to established short-segment Barrera's disease present in the lower third of the esophagus.  Mucosa was biopsied with a cold forceps for histology in 4 quadrants in the lower third of the esophagus. . FINAL MICROSCOPIC DIAGNOSIS:  A. DISTAL ESOPHAGUS, BIOPSY: -Squamous mucosa with reflux associated changes. -Gastric cardia type mucosa with chronic inflammation and foveolar hyperplasia. -Alcian blue/PAS stain is negative for intestinal metaplasia. No fungus. -There is no evidence of dysplasia or malignancy. . 12/2016: chronic esophagitis, no dysplasia .

## 2024-01-09 ENCOUNTER — TELEPHONE ENCOUNTER (OUTPATIENT)
Dept: URBAN - METROPOLITAN AREA CLINIC 96 | Facility: CLINIC | Age: 59
End: 2024-01-09

## 2024-01-12 ENCOUNTER — OUT OF OFFICE VISIT (OUTPATIENT)
Dept: URBAN - METROPOLITAN AREA SURGERY CENTER 18 | Facility: SURGERY CENTER | Age: 59
End: 2024-01-12
Payer: COMMERCIAL

## 2024-01-12 DIAGNOSIS — Z86.010 ADENOMAS PERSONAL HISTORY OF COLONIC POLYPS: ICD-10-CM

## 2024-01-12 DIAGNOSIS — Z09 ENCNTR FOR F/U EXAM AFT TRTMT FOR COND OTH THAN MALIG NEOPLM: ICD-10-CM

## 2024-01-12 DIAGNOSIS — D12.0 ADENOMA OF CECUM: ICD-10-CM

## 2024-01-12 DIAGNOSIS — D12.4 ADENOMA OF DESCENDING COLON: ICD-10-CM

## 2024-01-12 DIAGNOSIS — D12.0 BENIGN TUMOR OF ILEOCECAL VALVE: ICD-10-CM

## 2024-01-12 DIAGNOSIS — Z86.010 PERSONAL HISTORY OF COLON POLYPS: ICD-10-CM

## 2024-01-12 DIAGNOSIS — D12.4 ADENOMATOUS POLYP OF DESCENDING COLON: ICD-10-CM

## 2024-01-12 PROCEDURE — G8907 PT DOC NO EVENTS ON DISCHARG: HCPCS | Performed by: INTERNAL MEDICINE

## 2024-01-12 PROCEDURE — 00811 ANES LWR INTST NDSC NOS: CPT | Performed by: ANESTHESIOLOGY

## 2024-01-12 PROCEDURE — 45380 COLONOSCOPY AND BIOPSY: CPT | Performed by: INTERNAL MEDICINE

## 2024-01-12 PROCEDURE — 45385 COLONOSCOPY W/LESION REMOVAL: CPT | Performed by: INTERNAL MEDICINE

## 2024-08-12 ENCOUNTER — APPOINTMENT (RX ONLY)
Dept: URBAN - METROPOLITAN AREA OTHER 5 | Facility: OTHER | Age: 59
Setting detail: DERMATOLOGY
End: 2024-08-12

## 2024-08-12 DIAGNOSIS — L57.0 ACTINIC KERATOSIS: ICD-10-CM

## 2024-08-12 DIAGNOSIS — D22 MELANOCYTIC NEVI: ICD-10-CM

## 2024-08-12 DIAGNOSIS — I99.8 OTHER DISORDER OF CIRCULATORY SYSTEM: ICD-10-CM

## 2024-08-12 DIAGNOSIS — L82.1 OTHER SEBORRHEIC KERATOSIS: ICD-10-CM

## 2024-08-12 DIAGNOSIS — D18.0 HEMANGIOMA: ICD-10-CM

## 2024-08-12 PROBLEM — D22.5 MELANOCYTIC NEVI OF TRUNK: Status: ACTIVE | Noted: 2024-08-12

## 2024-08-12 PROBLEM — D18.01 HEMANGIOMA OF SKIN AND SUBCUTANEOUS TISSUE: Status: ACTIVE | Noted: 2024-08-12

## 2024-08-12 PROCEDURE — 99213 OFFICE O/P EST LOW 20 MIN: CPT | Mod: 25

## 2024-08-12 PROCEDURE — ? COUNSELING

## 2024-08-12 PROCEDURE — 17000 DESTRUCT PREMALG LESION: CPT

## 2024-08-12 PROCEDURE — ? LIQUID NITROGEN

## 2024-08-12 ASSESSMENT — LOCATION DETAILED DESCRIPTION DERM
LOCATION DETAILED: INFERIOR THORACIC SPINE
LOCATION DETAILED: RIGHT DORSAL FOOT
LOCATION DETAILED: LEFT MEDIAL UPPER BACK
LOCATION DETAILED: LEFT INFERIOR UPPER BACK
LOCATION DETAILED: RIGHT MEDIAL MALAR CHEEK
LOCATION DETAILED: EPIGASTRIC SKIN

## 2024-08-12 ASSESSMENT — LOCATION SIMPLE DESCRIPTION DERM
LOCATION SIMPLE: ABDOMEN
LOCATION SIMPLE: RIGHT CHEEK
LOCATION SIMPLE: UPPER BACK
LOCATION SIMPLE: RIGHT FOOT
LOCATION SIMPLE: LEFT UPPER BACK

## 2024-08-12 ASSESSMENT — PAIN INTENSITY VAS: HOW INTENSE IS YOUR PAIN 0 BEING NO PAIN, 10 BEING THE MOST SEVERE PAIN POSSIBLE?: NO PAIN

## 2024-08-12 ASSESSMENT — LOCATION ZONE DERM
LOCATION ZONE: FEET
LOCATION ZONE: FACE
LOCATION ZONE: TRUNK

## 2024-08-12 ASSESSMENT — TOTAL NUMBER OF LESIONS: # OF LESIONS?: 1

## 2024-08-12 NOTE — PROCEDURE: COUNSELING
Detail Level: Detailed
Sunscreen Recommendations: Neutrogena
Detail Level: Zone
Detail Level: Generalized

## 2024-12-02 ENCOUNTER — TELEPHONE ENCOUNTER (OUTPATIENT)
Dept: URBAN - METROPOLITAN AREA CLINIC 96 | Facility: CLINIC | Age: 59
End: 2024-12-02

## 2024-12-02 RX ORDER — PANTOPRAZOLE SODIUM 40 MG/1
1 TABLET TABLET, DELAYED RELEASE ORAL ONCE A DAY
Qty: 90 | Refills: 1

## 2025-02-05 ENCOUNTER — OFFICE VISIT (OUTPATIENT)
Dept: URBAN - METROPOLITAN AREA CLINIC 96 | Facility: CLINIC | Age: 60
End: 2025-02-05
Payer: COMMERCIAL

## 2025-02-05 VITALS
HEIGHT: 72 IN | BODY MASS INDEX: 24.08 KG/M2 | HEART RATE: 67 BPM | DIASTOLIC BLOOD PRESSURE: 87 MMHG | WEIGHT: 177.8 LBS | SYSTOLIC BLOOD PRESSURE: 148 MMHG

## 2025-02-05 DIAGNOSIS — K63.5 COLON POLYP: ICD-10-CM

## 2025-02-05 DIAGNOSIS — Z09 FOLLOW UP: ICD-10-CM

## 2025-02-05 DIAGNOSIS — K59.01 CONSTIPATION: ICD-10-CM

## 2025-02-05 DIAGNOSIS — K21.9 GERD (GASTROESOPHAGEAL REFLUX DISEASE): ICD-10-CM

## 2025-02-05 DIAGNOSIS — K20.0 EOSINOPHILIC ESOPHAGITIS: ICD-10-CM

## 2025-02-05 DIAGNOSIS — K22.70 BARRETT ESOPHAGUS: ICD-10-CM

## 2025-02-05 PROCEDURE — 99213 OFFICE O/P EST LOW 20 MIN: CPT | Performed by: INTERNAL MEDICINE

## 2025-02-05 RX ORDER — PANTOPRAZOLE SODIUM 40 MG/1
TABLET, DELAYED RELEASE ORAL
Qty: 30 TABLET | Status: ACTIVE | COMMUNITY

## 2025-02-05 RX ORDER — PANTOPRAZOLE SODIUM 40 MG/1
1 TABLET TABLET, DELAYED RELEASE ORAL ONCE A DAY
Qty: 90 | Refills: 4 | OUTPATIENT

## 2025-02-05 RX ORDER — PANTOPRAZOLE SODIUM 40 MG/1
1 TABLET TABLET, DELAYED RELEASE ORAL ONCE A DAY
Qty: 90 | Refills: 1 | Status: ON HOLD | COMMUNITY

## 2025-02-05 RX ORDER — ERGOCALCIFEROL 1.25 MG/1
TAKE 1 CAPSULE BY MOUTH ONE TIME PER WEEK CAPSULE, LIQUID FILLED ORAL
Qty: 4 EACH | Refills: 5 | Status: ACTIVE | COMMUNITY

## 2025-02-05 RX ORDER — LISINOPRIL 30 MG/1
1 TABLET TABLET ORAL ONCE A DAY
Status: ACTIVE | COMMUNITY

## 2025-02-05 RX ORDER — LISINOPRIL 40 MG/1
1 TABLET TABLET ORAL ONCE A DAY
Status: ON HOLD | COMMUNITY

## 2025-02-05 RX ORDER — ONDANSETRON HYDROCHLORIDE 4 MG/1
1 TABLET TABLET, FILM COATED ORAL EVERY 4 HOURS PRN
Qty: 3 | Refills: 0 | Status: ON HOLD | COMMUNITY
Start: 2023-10-11

## 2025-02-05 RX ORDER — TERAZOSIN 2 MG/1
TAKE 1 CAPSULE BY MOUTH EVERY NIGHT AT BEDTIME CAPSULE ORAL
Qty: 30 EACH | Refills: 0 | Status: ACTIVE | COMMUNITY

## 2025-02-05 RX ORDER — OMEPRAZOLE 40 MG/1
1 CAPSULE CAPSULE, DELAYED RELEASE ORAL ONCE A DAY
Qty: 90 CAPSULE | Refills: 4 | COMMUNITY

## 2025-02-05 RX ORDER — PANTOPRAZOLE SODIUM 20 MG/1
1 TABLET TABLET, DELAYED RELEASE ORAL ONCE A DAY
Qty: 30 TABLET | Refills: 11 | Status: ON HOLD | COMMUNITY
Start: 2022-07-20

## 2025-02-05 RX ORDER — TERAZOSIN 2 MG/1
TAKE 1 CAPSULE (2 MG) BY ORAL ROUTE ONCE DAILY AT BEDTIME CAPSULE ORAL 1
Qty: 0 | Refills: 0 | COMMUNITY
Start: 1900-01-01

## 2025-02-05 NOTE — HPI-TODAY'S VISIT:
Pt Gunnar is a 60 y/o individual with h/o Barrera's esophagus who is here for follow-up of his condition. . He has had chronic reflux disease.  His disease is under control on omeprazole 40mg daily.  If he misses a dose, he has recurrence of sxs within 1-2 days.  No N/V/abdominal pain, no dysphagia.   Normal BM, no blood in the stool.  . Previously on 11/2/2020, pt reports that overall he is doing well.  His reflux is doing well overall.  Is taking his H2 blocker, nearly on a daily basis. Previously on 2/16/21, pt reports that he has no sxs of GERD while taking omeprazole.  He had EGD which revealed active inflammation, but no Barretts or dysplasia/metaplasia.  He will occasionally skip a dose of PPI, but flares. Previously on 2/22/2022, pt reports that he is doing on once daily protonix  Does not take Pepcid. Previously on 7/20/2022, pt reports that he has no dysphagia/n/v/gerd.  If he misses a day, has breakthrough. Previously on 10/11/2023, pt reports that he must take his PPI and if he misses his dose, he has sxs.  Variable water intake.   Today on 2/5/2025, pt reports that overall doing well.  Exercising, improving weight loss/exercise; unable to wean off of PPI. . SH:  . PMH: GERD and Barrera's h/o.  BPH FH: No GI malignancy . 1/2024: A Ileocecal valve, polypectomy Tubular adenoma. B Descending colon, polypectomy Tubular adenoma, fragmented. . Repeat colonoscopy in 3 - 5 years for surveillance . 4/2022: The examined duodenum was normal Patchy mildly erythematous mucosa without bleeding was found in the entire examined stomach. Biopsies were taken with a cold forceps for histology. The pathology specimen was placed into Bottle Number 1 The Z-line was irregular and was found 35 cm from the incisors. Biopsies were taken with a cold forceps for histology. The pathology specimen was placed into Bottle Number 2. A small hiatal hernia was present The upper third of the esophagus and middle third of the esophagus were normal. Overall, it appears to be in good control. . A Gastric, biopsy Gastric mucosa with slight, nonspecific chronic inflammation. No Helicobacter pylori microorganisms identified with a special stain. B Esophagogastric junction, biopsy Increased intraepithelial eosinophils (maximum count of 22 in a single microscopic high power field). AN ALCIAN BLUE/PAS STAIN IS NEGATIVE FOR BOTH INTESTINAL METAPLASIA AND FUNGAL ELEMENTS. See comment. COMMENT: No single absolute eosinophilic count is fully diagnostic of eosinophilic esophagitis. In the proper clinical setting (see below), these findings raise the possibility of a diagnosis of "eosinophilic esophagitis". Eosinophilic esophagitis is defined as a clinicopathologic entity, combining clinical data on: (1) relevant symptoms (e.g. food impaction and dysphagia in adults); (2) >/= 15 eosinophils per high power field in esophageal biopsies; and (3) exclusion of other causes of eosinophilia, especially GERD. Other causes of eosinophilia include the following: GERD, eosinophilic gastroenteritis, hypereosinophilic syndrome, parasitic infection, fungal infection, drug-induced injury, inflammatory bowel disease, various vasculitides, collagen vascular disease, and recurrent vomiting. Correlation with clinical and endoscopic findings is necessary . 12/2020: A Gastric, biopsy Chronic gastritis. No Helicobacter pylori microorganisms are identified with a special stain. B Esophagogastric junction, biopsy Gastric mucosa with chronic inflammation. No intestinal metaplasia identified. An alcian blue/PAS stain is negative for both fungal elements and intestinal metaplasia. C Transverse colon, polypectomy Hyperplastic polyp. . Colonoscopy with h/o polyps (2007 and 2010), no polyp in 2015 . Colonoscopy in 2020: fair prep, no adenoma . 6/2018 EGD: There were esophageal mucosal changes secondary to established short-segment Barrera's disease present in the lower third of the esophagus.  Mucosa was biopsied with a cold forceps for histology in 4 quadrants in the lower third of the esophagus. . FINAL MICROSCOPIC DIAGNOSIS:  A. DISTAL ESOPHAGUS, BIOPSY: -Squamous mucosa with reflux associated changes. -Gastric cardia type mucosa with chronic inflammation and foveolar hyperplasia. -Alcian blue/PAS stain is negative for intestinal metaplasia. No fungus. -There is no evidence of dysplasia or malignancy. . 12/2016: chronic esophagitis, no dysplasia .

## 2025-08-12 ENCOUNTER — APPOINTMENT (OUTPATIENT)
Dept: URBAN - METROPOLITAN AREA OTHER 5 | Facility: OTHER | Age: 60
Setting detail: DERMATOLOGY
End: 2025-08-12

## 2025-08-12 DIAGNOSIS — L82.1 OTHER SEBORRHEIC KERATOSIS: ICD-10-CM

## 2025-08-12 DIAGNOSIS — D22 MELANOCYTIC NEVI: ICD-10-CM

## 2025-08-12 DIAGNOSIS — L85.3 XEROSIS CUTIS: ICD-10-CM

## 2025-08-12 DIAGNOSIS — L57.0 ACTINIC KERATOSIS: ICD-10-CM

## 2025-08-12 DIAGNOSIS — I99.8 OTHER DISORDER OF CIRCULATORY SYSTEM: ICD-10-CM

## 2025-08-12 DIAGNOSIS — L82.0 INFLAMED SEBORRHEIC KERATOSIS: ICD-10-CM

## 2025-08-12 DIAGNOSIS — D18.0 HEMANGIOMA: ICD-10-CM

## 2025-08-12 DIAGNOSIS — L57.8 OTHER SKIN CHANGES DUE TO CHRONIC EXPOSURE TO NONIONIZING RADIATION: ICD-10-CM

## 2025-08-12 PROBLEM — D22.61 MELANOCYTIC NEVI OF RIGHT UPPER LIMB, INCLUDING SHOULDER: Status: ACTIVE | Noted: 2025-08-12

## 2025-08-12 PROBLEM — D18.01 HEMANGIOMA OF SKIN AND SUBCUTANEOUS TISSUE: Status: ACTIVE | Noted: 2025-08-12

## 2025-08-12 PROCEDURE — ? COUNSELING

## 2025-08-12 PROCEDURE — ? LIQUID NITROGEN

## 2025-08-12 ASSESSMENT — TOTAL NUMBER OF LESIONS: # OF LESIONS?: 1

## 2025-08-12 ASSESSMENT — PAIN INTENSITY VAS: HOW INTENSE IS YOUR PAIN 0 BEING NO PAIN, 10 BEING THE MOST SEVERE PAIN POSSIBLE?: NO PAIN

## 2025-08-12 ASSESSMENT — LOCATION ZONE DERM
LOCATION ZONE: FACE
LOCATION ZONE: FEET
LOCATION ZONE: ARM
LOCATION ZONE: TOE
LOCATION ZONE: TRUNK
LOCATION ZONE: LEG

## 2025-08-12 ASSESSMENT — LOCATION DETAILED DESCRIPTION DERM
LOCATION DETAILED: RIGHT CENTRAL TEMPLE
LOCATION DETAILED: PERIUMBILICAL SKIN
LOCATION DETAILED: RIGHT INFERIOR LATERAL FOREHEAD
LOCATION DETAILED: LEFT PROXIMAL PRETIBIAL REGION
LOCATION DETAILED: RIGHT DORSAL FOOT
LOCATION DETAILED: RIGHT INFERIOR CENTRAL MALAR CHEEK
LOCATION DETAILED: LEFT MEDIAL ZYGOMA
LOCATION DETAILED: LEFT MEDIAL UPPER BACK
LOCATION DETAILED: LEFT CENTRAL TEMPLE
LOCATION DETAILED: RIGHT DISTAL PLANTAR 3RD TOE
LOCATION DETAILED: RIGHT INFERIOR UPPER BACK
LOCATION DETAILED: LEFT INFERIOR LATERAL FOREHEAD
LOCATION DETAILED: RIGHT POSTERIOR SHOULDER
LOCATION DETAILED: LEFT DISTAL DORSAL FOREARM
LOCATION DETAILED: RIGHT SUPERIOR MEDIAL MIDBACK
LOCATION DETAILED: RIGHT PROXIMAL PRETIBIAL REGION
LOCATION DETAILED: RIGHT INFERIOR MEDIAL FOREHEAD
LOCATION DETAILED: LEFT SUPERIOR UPPER BACK
LOCATION DETAILED: LEFT INFERIOR UPPER BACK

## 2025-08-12 ASSESSMENT — LOCATION SIMPLE DESCRIPTION DERM
LOCATION SIMPLE: LEFT TEMPLE
LOCATION SIMPLE: LEFT FOREARM
LOCATION SIMPLE: LEFT FOREHEAD
LOCATION SIMPLE: RIGHT TEMPLE
LOCATION SIMPLE: RIGHT FOREHEAD
LOCATION SIMPLE: RIGHT SHOULDER
LOCATION SIMPLE: LEFT UPPER BACK
LOCATION SIMPLE: RIGHT UPPER BACK
LOCATION SIMPLE: RIGHT 3RD TOE
LOCATION SIMPLE: RIGHT PRETIBIAL REGION
LOCATION SIMPLE: RIGHT FOOT
LOCATION SIMPLE: ABDOMEN
LOCATION SIMPLE: LEFT ZYGOMA
LOCATION SIMPLE: LEFT PRETIBIAL REGION
LOCATION SIMPLE: RIGHT CHEEK
LOCATION SIMPLE: RIGHT LOWER BACK